# Patient Record
Sex: FEMALE | Race: WHITE | NOT HISPANIC OR LATINO | Employment: STUDENT | URBAN - METROPOLITAN AREA
[De-identification: names, ages, dates, MRNs, and addresses within clinical notes are randomized per-mention and may not be internally consistent; named-entity substitution may affect disease eponyms.]

---
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
## 2025-02-17 ENCOUNTER — RESULTS FOLLOW-UP (OUTPATIENT)
Dept: HEPATOLOGY | Facility: HOSPITAL | Age: 20
End: 2025-02-17
Payer: COMMERCIAL

## 2025-02-17 ENCOUNTER — OFFICE VISIT (OUTPATIENT)
Dept: INTERNAL MEDICINE | Facility: CLINIC | Age: 20
End: 2025-02-17
Payer: COMMERCIAL

## 2025-02-17 VITALS
RESPIRATION RATE: 16 BRPM | HEIGHT: 65 IN | BODY MASS INDEX: 19.83 KG/M2 | WEIGHT: 119 LBS | OXYGEN SATURATION: 97 % | DIASTOLIC BLOOD PRESSURE: 80 MMHG | SYSTOLIC BLOOD PRESSURE: 124 MMHG | HEART RATE: 100 BPM | TEMPERATURE: 98 F

## 2025-02-17 DIAGNOSIS — G25.71 AKATHISIA: ICD-10-CM

## 2025-02-17 DIAGNOSIS — F64.8 OTHER GENDER IDENTITY DISORDERS: ICD-10-CM

## 2025-02-17 DIAGNOSIS — R00.2 PALPITATION: Primary | ICD-10-CM

## 2025-02-17 LAB
ALBUMIN SERPL-MCNC: 3.9 G/DL (ref 3.5–5)
ALBUMIN/GLOB SERPL: 1.6 RATIO (ref 1.1–2)
ALP SERPL-CCNC: 60 UNIT/L (ref 40–150)
ALT SERPL-CCNC: 12 UNIT/L (ref 0–55)
ANION GAP SERPL CALC-SCNC: 6 MEQ/L
AST SERPL-CCNC: 13 UNIT/L (ref 5–34)
BASOPHILS # BLD AUTO: 0.03 X10(3)/MCL
BASOPHILS NFR BLD AUTO: 0.6 %
BILIRUB SERPL-MCNC: 0.3 MG/DL
BUN SERPL-MCNC: 18 MG/DL (ref 7–18.7)
CALCIUM SERPL-MCNC: 9.1 MG/DL (ref 8.4–10.2)
CHLORIDE SERPL-SCNC: 108 MMOL/L (ref 98–107)
CO2 SERPL-SCNC: 29 MMOL/L (ref 22–29)
CREAT SERPL-MCNC: 0.83 MG/DL (ref 0.55–1.02)
CREAT/UREA NIT SERPL: 22
EOSINOPHIL # BLD AUTO: 0.06 X10(3)/MCL (ref 0–0.9)
EOSINOPHIL NFR BLD AUTO: 1.1 %
ERYTHROCYTE [DISTWIDTH] IN BLOOD BY AUTOMATED COUNT: 12.2 % (ref 11.5–17)
FERRITIN SERPL-MCNC: 15.87 NG/ML (ref 4.63–204)
GFR SERPLBLD CREATININE-BSD FMLA CKD-EPI: >60 ML/MIN/1.73/M2
GLOBULIN SER-MCNC: 2.5 GM/DL (ref 2.4–3.5)
GLUCOSE SERPL-MCNC: 84 MG/DL (ref 74–100)
HCT VFR BLD AUTO: 39.2 % (ref 37–47)
HGB BLD-MCNC: 13.1 G/DL (ref 12–16)
IMM GRANULOCYTES # BLD AUTO: 0.01 X10(3)/MCL (ref 0–0.04)
IMM GRANULOCYTES NFR BLD AUTO: 0.2 %
IRON SATN MFR SERPL: 37 % (ref 20–50)
IRON SERPL-MCNC: 97 UG/DL (ref 50–170)
LYMPHOCYTES # BLD AUTO: 1.21 X10(3)/MCL (ref 0.6–4.6)
LYMPHOCYTES NFR BLD AUTO: 22.9 %
MCH RBC QN AUTO: 30.7 PG (ref 27–31)
MCHC RBC AUTO-ENTMCNC: 33.4 G/DL (ref 33–36)
MCV RBC AUTO: 91.8 FL (ref 80–94)
MONOCYTES # BLD AUTO: 0.52 X10(3)/MCL (ref 0.1–1.3)
MONOCYTES NFR BLD AUTO: 9.8 %
NEUTROPHILS # BLD AUTO: 3.45 X10(3)/MCL (ref 2.1–9.2)
NEUTROPHILS NFR BLD AUTO: 65.4 %
NRBC BLD AUTO-RTO: 0 %
PLATELET # BLD AUTO: 219 X10(3)/MCL (ref 130–400)
PMV BLD AUTO: 11.1 FL (ref 7.4–10.4)
POTASSIUM SERPL-SCNC: 3.4 MMOL/L (ref 3.5–5.1)
PROT SERPL-MCNC: 6.4 GM/DL (ref 6.4–8.3)
RBC # BLD AUTO: 4.27 X10(6)/MCL (ref 4.2–5.4)
SODIUM SERPL-SCNC: 143 MMOL/L (ref 136–145)
T4 FREE SERPL-MCNC: 0.98 NG/DL (ref 0.7–1.48)
TIBC SERPL-MCNC: 162 UG/DL (ref 70–310)
TIBC SERPL-MCNC: 259 UG/DL (ref 250–450)
TRANSFERRIN SERPL-MCNC: 225 MG/DL (ref 180–382)
TSH SERPL-ACNC: 1.25 UIU/ML (ref 0.35–4.94)
WBC # BLD AUTO: 5.28 X10(3)/MCL (ref 4.5–11.5)

## 2025-02-17 PROCEDURE — 85025 COMPLETE CBC W/AUTO DIFF WBC: CPT | Performed by: INTERNAL MEDICINE

## 2025-02-17 PROCEDURE — 84439 ASSAY OF FREE THYROXINE: CPT | Performed by: INTERNAL MEDICINE

## 2025-02-17 PROCEDURE — 82728 ASSAY OF FERRITIN: CPT | Performed by: INTERNAL MEDICINE

## 2025-02-17 PROCEDURE — 83550 IRON BINDING TEST: CPT | Performed by: INTERNAL MEDICINE

## 2025-02-17 PROCEDURE — 36415 COLL VENOUS BLD VENIPUNCTURE: CPT | Performed by: INTERNAL MEDICINE

## 2025-02-17 PROCEDURE — 80053 COMPREHEN METABOLIC PANEL: CPT | Performed by: INTERNAL MEDICINE

## 2025-02-17 PROCEDURE — 84443 ASSAY THYROID STIM HORMONE: CPT | Performed by: INTERNAL MEDICINE

## 2025-02-17 RX ORDER — VILAZODONE HYDROCHLORIDE 40 MG/1
40 TABLET ORAL DAILY
COMMUNITY
Start: 2025-02-13

## 2025-02-17 NOTE — PROGRESS NOTES
Labs reviewed ensure that you are eating enough potassium rich foods like tomatoes, bran based cereals like raising bran, bananas.  Make sure you are also drinking enough water  The some ferritin is number that tells us about iron storage and you must have just been coming off your cycle because the ferritin is kind of at the lower end.  The remainder of the other labs look sufficient I would take a multivitamin like a prenatal multivitamin with iron and folic acid and lets see if that does not help.

## 2025-02-17 NOTE — PROGRESS NOTES
Internal Medicine    Patient ID: [id number]     Chief Complaint: Weight Loss, Tachycardia, and Gender Identity Disorder      HPI:     Adeel Boykin is a 19 y.o. female here today for a follow up.     Patient reports progressively worsening appetite issues with minimal appetite and difficulty eating. The onset coincides with recent medication changes, specifically the discontinuation of Lexapro in October and the initiation of Viibryd. Her weight has recently increased from 01/16 a 119 that appears to coincide with the increase dosing of the Viibryd which went from 20-40 at the beginning of this month     He reports painful menstrual cycles. During one episode, the pain was so severe that a friend had to assist her to her room as she was unable to get off the floor due to cramps. She does not have a gynecologist and has been managing the pain with Tylenol, Advil, and a heating pad.    He mentions restless leg syndrome affecting just her legs, which she has had since before starting Viibryd. He also has episodes of elevated heart rate, particularly when speaking or being around many people. He becomes aware of her heartbeat and checks her heart rate on her Apple Watch. He denies feeling like she is going to pass out during these episodes.    His anxiety is somewhat managed but not entirely, while her depression appears to be better controlled. He denies elevated heart rate when at home or going to sleep.         Past Medical History:   Diagnosis Date    ADHD (attention deficit hyperactivity disorder)         Past Surgical History:   Procedure Laterality Date    LUMBAR LAMINECTOMY WITH DISCECTOMY N/A 4/1/2021    Procedure: Right L4/5 Discectomy SNS: SSEP/EMG New [place] + Sawyer 1st Start;  Surgeon: Ravi Way MD;  Location: [facility] OR [address] [place] [address];  Service: Neurosurgery;  Laterality: N/A;        Social History     Tobacco Use    Smoking status: Never    Smokeless tobacco: Never   Substance and Sexual Activity  "   Alcohol use: Yes     Comment: Occassionally    Drug use: No    Sexual activity: Never        Current Outpatient Medications   Medication Instructions    buPROPion (WELLBUTRIN XL) 300 mg, Daily    buPROPion (WELLBUTRIN XL) 150 mg, Daily    cetirizine (ZYRTEC) 10 mg, Daily    cholecalciferol (vitamin D3) (VITAMIN D3) 2,000 Units, Oral, Daily    CONCERTA 54 mg, Daily    EScitalopram oxalate (LEXAPRO) 20 mg, Daily    methylphenidate HCl (RITALIN) 10 mg, As needed (PRN)    vilazodone (VIIBRYD) 40 mg, Daily       Review of patient's allergies indicates:  No Known Allergies     Patient Care Team:  Levi Pa MD as PCP - General (Internal Medicine)  Adán Anderson MD (Psychiatry)     Subjective:     Review of Systems    12 point review of systems conducted, negative except as stated in the history of present illness. See HPI for details.    Objective:     Visit Vitals  /80 (BP Location: Left arm, Patient Position: Sitting)   Pulse 100   Temp 97.5 °F (36.4 °C) (Temporal)   Resp 16   Ht 5' 5" (1.651 m)   Wt 54 kg (119 lb)   LMP 01/06/2025   SpO2 97%   BMI 19.80 kg/m²       Physical Exam  Constitutional:       Appearance: Normal appearance.   HENT:      Head: Normocephalic and atraumatic.   Eyes:      Extraocular Movements: Extraocular movements intact.      Pupils: Pupils are equal, round, and reactive to light.   Cardiovascular:      Rate and Rhythm: Normal rate and regular rhythm.   Pulmonary:      Effort: Pulmonary effort is normal.      Breath sounds: Normal breath sounds.   Skin:     General: Skin is warm and dry.   Neurological:      General: No focal deficit present.      Mental Status: He is alert.   Psychiatric:         Mood and Affect: Mood normal.         Labs Reviewed:     Chemistry:  Lab Results   Component Value Date     03/05/2024    K 4.1 03/05/2024    BUN 14 03/05/2024    CREATININE 0.62 03/05/2024    EGFRNORACEVR 132 03/05/2024    GLUCOSE 96 02/15/2020    CALCIUM 9.1 03/05/2024 " "   ALKPHOS 148 02/15/2020    LABPROT 6.7 02/15/2020    ALBUMIN 4.5 03/05/2024    BILIDIR 0.12 02/15/2020    IBILI 0.18 02/15/2020    AST 12 03/05/2024    ALT 13 03/05/2024    [id number] 23.2 (L) 03/05/2024    TSH 0.983 03/05/2024    [id number] 0.93 02/15/2020        No results found for: "HGBA1C", "MICROALBCREA"     Hematology:  Lab Results   Component Value Date    WBC 6.2 03/05/2024    HGB 13.1 03/05/2024    HCT 40.5 03/05/2024     03/05/2024       Lipid Panel:  Lab Results   Component Value Date    CHOL 126 03/05/2024    HDL 55 03/05/2024    LDL 88 02/15/2020    TRIG 52 03/05/2024    TOTALCHOLEST 2.7 02/15/2020        Urine:  Lab Results   Component Value Date    APPEARANCEUA Clear 02/01/2020    PROTEINUA Trace 03/05/2024    LEUKOCYTESUR Negative 03/05/2024    RBCUA 0-2 03/05/2024    WBCUA None seen 03/05/2024    BACTERIA Few 03/05/2024        Assessment and Plan:     Assessment & Plan    R00.2 Palpitation  G25.71 Akathisia  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.8 Other gender identity disorders  F41.9 Anxiety disorder, unspecified    IMPRESSION:   Considered impact of current medications (Wellbutrin, Viibryd) on patient's reported appetite issues and anxiety   Evaluated recent medication changes, including discontinuation of Lexapro and initiation of Viibryd, as potential factors in appetite decrease   Assessed need for metabolic workup to rule out underlying causes for reported symptoms   Considered beta blocker for persistent tachycardia, but opted against due to potential side effects and lack of pathogenic cause   Evaluated need for ovarian ultrasound due to reported menstrual pain, considering patient's transgender status and future hormone therapy plans    R00.2 PALPITATION:  - Discussed that tachycardia may be related to current medications and anxiety, rather than a pathogenic cause.  - Patient reports heart rate increases, especially when speaking or being around people, occurring multiple times daily or weekly.  - Heart rate " typically stays around 100 and was slightly elevated during the visit.  - Planned to update labs to check thyroid, iron, and blood sugar to rule out other potential causes.  - Could consider beta blockers for heart rate management due to potential side effects.  - Also on a chronic stimulant for ADHD    G25.71 AKATHISIA:  - Explained akathisia as a potential side effect of Viibryd.    F64.8 OTHER GENDER IDENTITY DISORDERS:  - Discussed how estrogen-based treatments for menstrual pain could counteract desired masculinization effects of future testosterone therapy.  - Referred the patient to Niru Rodriguez for medication management.    F41.9 ANXIETY DISORDER, UNSPECIFIED:  - Patient reports that anxiety is not well controlled.  - Currently on Wellbutrin 450mg daily (150mg and 300mg), Viibryd (dose increased on February 13th), and Ritalin for ADD  - Explained potential side effects of Wellbutrin, including appetite suppression.  - Contact Bishop Anderson to discuss appetite issues and recent medication changes.          No follow-ups on file. In addition to their scheduled follow up, the patient has also been instructed to follow up on as needed basis.     Future Appointments   Date Time Provider Department Center   8/4/2025  3:20 PM Levi Pa MD [id number]        Levi Pa MD

## (undated) DEVICE — DIFFUSER

## (undated) DEVICE — KIT EVACUATOR 3-SPRING 1/8 DRN

## (undated) DEVICE — DRAPE C ARM 42 X 120 10/BX

## (undated) DEVICE — DRAPE STERI-DRAPE 1000 17X11IN

## (undated) DEVICE — DRAPE ABDOMINAL TIBURON 14X11

## (undated) DEVICE — DRESSING AQUACEL FOAM 5 X 5

## (undated) DEVICE — CLOSURE SKIN 1X5 STERI-STRIP

## (undated) DEVICE — SUT STRATAFIX 3-0 30CM

## (undated) DEVICE — SUT VICRYL PLUS 2-0 CT1 18

## (undated) DEVICE — SUTURE STRATAFIX PGA PCL 3-0

## (undated) DEVICE — APPLICATOR CHLORAPREP ORN 26ML

## (undated) DEVICE — ELECTRODE BLADE INSULATED 1 IN

## (undated) DEVICE — CORD BIPOLAR 12 FOOT

## (undated) DEVICE — SEE MEDLINE ITEM 156905

## (undated) DEVICE — SYS CLSR DERMABOND PRINEO 22CM

## (undated) DEVICE — MARKER SKIN STND TIP BLUE BARR

## (undated) DEVICE — NDL SPINAL 18GX3.5 SPINOCAN

## (undated) DEVICE — SEE MEDLINE ITEM 157150

## (undated) DEVICE — DRESSING TRANS 4X4 TEGADERM

## (undated) DEVICE — DRESSING SURGICAL 1/2X1/2

## (undated) DEVICE — CARTRIDGE OIL

## (undated) DEVICE — NDL HYPO REG 25G X 1 1/2

## (undated) DEVICE — ELECTRODE REM PLYHSV RETURN 9

## (undated) DEVICE — DRESSING ABSRBNT ISLAND 3.6X8

## (undated) DEVICE — SUT VICRYL+ 1 CT1 18IN

## (undated) DEVICE — SEE MEDLINE ITEM 157117

## (undated) DEVICE — KIT SURGIFLO HEMOSTATIC MATRIX

## (undated) DEVICE — SEE MEDLINE ITEM 146417

## (undated) DEVICE — DRESSING MEPILEX BORDER 4 X 4

## (undated) DEVICE — NDL HYPO 27G X 1 1/2

## (undated) DEVICE — DRAPE C-ARMOR EQUIPMENT COVER

## (undated) DEVICE — SEE MEDLINE ITEM 157131

## (undated) DEVICE — BUR BONE CUT MICRO TPS 3X3.8MM

## (undated) DEVICE — SYS PRINEO SKIN CLOSURE

## (undated) DEVICE — Device

## (undated) DEVICE — SEE MEDLINE ITEM 146347